# Patient Record
Sex: FEMALE | Race: OTHER | NOT HISPANIC OR LATINO
[De-identification: names, ages, dates, MRNs, and addresses within clinical notes are randomized per-mention and may not be internally consistent; named-entity substitution may affect disease eponyms.]

---

## 2017-10-17 ENCOUNTER — TRANSCRIPTION ENCOUNTER (OUTPATIENT)
Age: 72
End: 2017-10-17

## 2019-01-08 ENCOUNTER — TRANSCRIPTION ENCOUNTER (OUTPATIENT)
Age: 74
End: 2019-01-08

## 2022-11-25 ENCOUNTER — APPOINTMENT (OUTPATIENT)
Dept: INTERNAL MEDICINE | Facility: CLINIC | Age: 77
End: 2022-11-25

## 2022-12-26 ENCOUNTER — NON-APPOINTMENT (OUTPATIENT)
Age: 77
End: 2022-12-26

## 2022-12-27 ENCOUNTER — APPOINTMENT (OUTPATIENT)
Dept: INTERNAL MEDICINE | Facility: CLINIC | Age: 77
End: 2022-12-27

## 2023-04-07 ENCOUNTER — APPOINTMENT (OUTPATIENT)
Dept: INTERNAL MEDICINE | Facility: CLINIC | Age: 78
End: 2023-04-07
Payer: MEDICARE

## 2023-04-07 VITALS
WEIGHT: 113 LBS | HEIGHT: 60 IN | BODY MASS INDEX: 22.19 KG/M2 | SYSTOLIC BLOOD PRESSURE: 138 MMHG | OXYGEN SATURATION: 98 % | HEART RATE: 119 BPM | DIASTOLIC BLOOD PRESSURE: 84 MMHG

## 2023-04-07 DIAGNOSIS — Z82.49 FAMILY HISTORY OF ISCHEMIC HEART DISEASE AND OTHER DISEASES OF THE CIRCULATORY SYSTEM: ICD-10-CM

## 2023-04-07 DIAGNOSIS — M10.9 GOUT, UNSPECIFIED: ICD-10-CM

## 2023-04-07 DIAGNOSIS — Z00.00 ENCOUNTER FOR GENERAL ADULT MEDICAL EXAMINATION W/OUT ABNORMAL FINDINGS: ICD-10-CM

## 2023-04-07 PROCEDURE — 93000 ELECTROCARDIOGRAM COMPLETE: CPT | Mod: 59

## 2023-04-07 PROCEDURE — 90677 PCV20 VACCINE IM: CPT

## 2023-04-07 PROCEDURE — G0439: CPT

## 2023-04-07 PROCEDURE — G0444 DEPRESSION SCREEN ANNUAL: CPT | Mod: 59

## 2023-04-07 PROCEDURE — G0009: CPT

## 2023-04-07 PROCEDURE — 36415 COLL VENOUS BLD VENIPUNCTURE: CPT

## 2023-04-07 PROCEDURE — 99204 OFFICE O/P NEW MOD 45 MIN: CPT | Mod: 25

## 2023-04-07 NOTE — PLAN
[FreeTextEntry1] : HCM\par \par *Immunizations \par COVID -19 UTD\par Influenza 2022 \par Pneumococcal - patient requests prevnar - reports no history of receiving \par TDAP - due \par Shingrix - Patient is interested in the Shingrix vaccination. A prescription for Shingrix was given to the patient to be administered by the pharmacist.\par Patient will call us and inform us of date of vaccination.\par \par Preventive medicine discussed - including importance of lifestyle modification - with incorporation of healthy diet + regular exercise\par \par \par #HTN\par Patient with history of hypertension. \par BP today acceptable\par Patient to continue current regimen as prescribed. Patient instructed to continue monitoring blood pressure at home and to keep a journal. Will continue to monitor patient's bp and adjust his hypertensive regimen as needed.\par \par #Gout\par c/w allopurinol as prescribed \par \par FBW drawn

## 2023-04-07 NOTE — HISTORY OF PRESENT ILLNESS
[FreeTextEntry1] : Establish care  [de-identified] : Establish care \par Previous PCP moved to [facility] - has been lost to follow-up Dr. Nel Singer\par \par \par Patient has been in good overall health this past year and has no active complaints.\par All data, labs, consult notes and studies reviewed.

## 2023-04-07 NOTE — HEALTH RISK ASSESSMENT
[Very Good] : ~his/her~  mood as very good [No] : No [No falls in past year] : Patient reported no falls in the past year [0] : 2) Feeling down, depressed, or hopeless: Not at all (0) [PHQ-2 Negative - No further assessment needed] : PHQ-2 Negative - No further assessment needed [With Family] : lives with family [Retired] : retired [] :  [# Of Children ___] : has [unfilled] children [Feels Safe at Home] : Feels safe at home [Fully functional (bathing, dressing, toileting, transferring, walking, feeding)] : Fully functional (bathing, dressing, toileting, transferring, walking, feeding) [Fully functional (using the telephone, shopping, preparing meals, housekeeping, doing laundry, using] : Fully functional and needs no help or supervision to perform IADLs (using the telephone, shopping, preparing meals, housekeeping, doing laundry, using transportation, managing medications and managing finances) [Reports normal functional visual acuity (ie: able to read med bottle)] : Reports normal functional visual acuity [Never] : Never [de-identified] : active [de-identified] : balanced varied diet without restrictions  [[id number]] : 0 [Language] : denies difficulty with language [Handling Complex Tasks] : denies difficulty handling complex tasks [Reports changes in hearing] : Reports no changes in hearing [Reports changes in vision] : Reports no changes in vision [Reports changes in dental health] : Reports no changes in dental health [Smoke Detector] : no smoke detector [Safety elements used in home] : no safety elements used in home [MammogramComments] : denies  [PapSmearComments] : denies  [BoneDensityComments] : referral  [ColonoscopyComments] : denies

## 2023-04-10 ENCOUNTER — NON-APPOINTMENT (OUTPATIENT)
Age: 78
End: 2023-04-10

## 2023-04-10 LAB
25(OH)D3 SERPL-MCNC: 84.9 NG/ML
ALBUMIN SERPL ELPH-MCNC: 4.5 G/DL
ALP BLD-CCNC: 92 U/L
ALT SERPL-CCNC: 29 U/L
ANION GAP SERPL CALC-SCNC: 17 MMOL/L
AST SERPL-CCNC: 30 U/L
BASOPHILS # BLD AUTO: 0.07 K/UL
BASOPHILS NFR BLD AUTO: 0.8 %
BILIRUB SERPL-MCNC: 0.4 MG/DL
BUN SERPL-MCNC: 23 MG/DL
CALCIUM SERPL-MCNC: 10.9 MG/DL
CHLORIDE SERPL-SCNC: 103 MMOL/L
CHOLEST SERPL-MCNC: 200 MG/DL
CO2 SERPL-SCNC: 20 MMOL/L
CREAT SERPL-MCNC: 0.83 MG/DL
EGFR: 72 ML/MIN/1.73M2
EOSINOPHIL # BLD AUTO: 0.12 K/UL
EOSINOPHIL NFR BLD AUTO: 1.4 %
ESTIMATED AVERAGE GLUCOSE: 131 MG/DL
FERRITIN SERPL-MCNC: 610 NG/ML
FOLATE SERPL-MCNC: 14.1 NG/ML
GLUCOSE SERPL-MCNC: 132 MG/DL
HBA1C MFR BLD HPLC: 6.2 %
HCT VFR BLD CALC: 40.9 %
HDLC SERPL-MCNC: 69 MG/DL
HGB BLD-MCNC: 13.4 G/DL
IMM GRANULOCYTES NFR BLD AUTO: 0.8 %
IRON SATN MFR SERPL: 28 %
IRON SERPL-MCNC: 80 UG/DL
LDLC SERPL CALC-MCNC: 116 MG/DL
LYMPHOCYTES # BLD AUTO: 1.58 K/UL
LYMPHOCYTES NFR BLD AUTO: 17.9 %
MAN DIFF?: NORMAL
MCHC RBC-ENTMCNC: 30.5 PG
MCHC RBC-ENTMCNC: 32.8 GM/DL
MCV RBC AUTO: 93.2 FL
MONOCYTES # BLD AUTO: 0.45 K/UL
MONOCYTES NFR BLD AUTO: 5.1 %
NEUTROPHILS # BLD AUTO: 6.52 K/UL
NEUTROPHILS NFR BLD AUTO: 74 %
NONHDLC SERPL-MCNC: 132 MG/DL
PLATELET # BLD AUTO: 299 K/UL
POTASSIUM SERPL-SCNC: 3.4 MMOL/L
PROT SERPL-MCNC: 8.2 G/DL
RBC # BLD: 4.39 M/UL
RBC # FLD: 13.3 %
SODIUM SERPL-SCNC: 141 MMOL/L
TIBC SERPL-MCNC: 286 UG/DL
TRIGL SERPL-MCNC: 80 MG/DL
TSH SERPL-ACNC: 1.35 UIU/ML
UIBC SERPL-MCNC: 205 UG/DL
URATE SERPL-MCNC: 7.4 MG/DL
VIT B12 SERPL-MCNC: 991 PG/ML
WBC # FLD AUTO: 8.81 K/UL

## 2023-05-04 ENCOUNTER — NON-APPOINTMENT (OUTPATIENT)
Age: 78
End: 2023-05-04

## 2023-05-05 LAB — NONINV COLON CA DNA+OCC BLD SCRN STL QL: NEGATIVE

## 2023-08-02 ENCOUNTER — APPOINTMENT (OUTPATIENT)
Dept: INTERNAL MEDICINE | Facility: CLINIC | Age: 78
End: 2023-08-02
Payer: MEDICARE

## 2023-08-02 VITALS
SYSTOLIC BLOOD PRESSURE: 110 MMHG | HEART RATE: 96 BPM | BODY MASS INDEX: 23.75 KG/M2 | TEMPERATURE: 97 F | DIASTOLIC BLOOD PRESSURE: 70 MMHG | HEIGHT: 60 IN | OXYGEN SATURATION: 97 % | WEIGHT: 121 LBS

## 2023-08-02 DIAGNOSIS — R73.03 PREDIABETES.: ICD-10-CM

## 2023-08-02 DIAGNOSIS — I10 ESSENTIAL (PRIMARY) HYPERTENSION: ICD-10-CM

## 2023-08-02 DIAGNOSIS — T45.2X1A POISONING BY VITAMINS, ACCIDENTAL (UNINTENTIONAL), INITIAL ENCOUNTER: ICD-10-CM

## 2023-08-02 PROCEDURE — 99214 OFFICE O/P EST MOD 30 MIN: CPT

## 2023-08-02 NOTE — HISTORY OF PRESENT ILLNESS
[FreeTextEntry1] : Patient presents today for follow-up of chronic medical conditions.  [de-identified] : Patient has been in good overall health this past year and has no active complaints. All data, labs, consult notes and studies reviewed.   Has stopped taking all vitamins and supplements since last visit given lab results

## 2023-08-02 NOTE — HEALTH RISK ASSESSMENT
[Very Good] : ~his/her~  mood as very good [No] : No [No falls in past year] : Patient reported no falls in the past year [0] : 2) Feeling down, depressed, or hopeless: Not at all (0) [PHQ-2 Negative - No further assessment needed] : PHQ-2 Negative - No further assessment needed [de-identified] : active [de-identified] : balanced varied diet without restrictions  [[id number]] : 0 [Never] : Never [Language] : denies difficulty with language [Handling Complex Tasks] : denies difficulty handling complex tasks [With Family] : lives with family [Retired] : retired [] :  [# Of Children ___] : has [unfilled] children [Feels Safe at Home] : Feels safe at home [Fully functional (bathing, dressing, toileting, transferring, walking, feeding)] : Fully functional (bathing, dressing, toileting, transferring, walking, feeding) [Fully functional (using the telephone, shopping, preparing meals, housekeeping, doing laundry, using] : Fully functional and needs no help or supervision to perform IADLs (using the telephone, shopping, preparing meals, housekeeping, doing laundry, using transportation, managing medications and managing finances) [Reports changes in hearing] : Reports no changes in hearing [Reports changes in vision] : Reports no changes in vision [Reports normal functional visual acuity (ie: able to read med bottle)] : Reports normal functional visual acuity [Reports changes in dental health] : Reports no changes in dental health [Smoke Detector] : no smoke detector [Safety elements used in home] : no safety elements used in home [MammogramComments] : denies  [PapSmearComments] : denies  [BoneDensityComments] : referral  [ColonoscopyComments] : denies

## 2023-08-02 NOTE — PLAN
[FreeTextEntry1] : HCM  *Immunizations  COVID -19 UTD Influenza 2022  Pneumococcal - patient requests prevnar - reports no history of receiving  TDAP - due  Shingrix - Patient is interested in the Shingrix vaccination. A prescription for Shingrix was given to the patient to be administered by the pharmacist. Patient will call us and inform us of date of vaccination.  Preventive medicine discussed - including importance of lifestyle modification - with incorporation of healthy diet + regular exercise   #HTN Patient with history of hypertension.  BP today acceptable Patient to continue current regimen as prescribed. Patient instructed to continue monitoring blood pressure at home and to keep a journal. Will continue to monitor patient's bp and adjust his hypertensive regimen as needed.  #Gout c/w allopurinol as prescribed   FBW drawn - will confirm all blood work normalized after discontinuation of supplements/vitamins

## 2023-08-02 NOTE — REVIEW OF SYSTEMS
[Patient Intake Form Reviewed] : Patient intake form was reviewed [FreeTextEntry1] : Negative except per HPI/Intake Form\par

## 2023-08-09 LAB
25(OH)D3 SERPL-MCNC: 45.3 NG/ML
ALBUMIN SERPL ELPH-MCNC: 4.6 G/DL
ALP BLD-CCNC: 73 U/L
ALT SERPL-CCNC: 25 U/L
ANION GAP SERPL CALC-SCNC: 14 MMOL/L
AST SERPL-CCNC: 26 U/L
BILIRUB SERPL-MCNC: 0.6 MG/DL
BUN SERPL-MCNC: 24 MG/DL
CALCIUM SERPL-MCNC: 10.2 MG/DL
CHLORIDE SERPL-SCNC: 104 MMOL/L
CO2 SERPL-SCNC: 22 MMOL/L
CREAT SERPL-MCNC: 0.72 MG/DL
EGFR: 85 ML/MIN/1.73M2
ESTIMATED AVERAGE GLUCOSE: 134 MG/DL
FERRITIN SERPL-MCNC: 508 NG/ML
GLUCOSE SERPL-MCNC: 136 MG/DL
HBA1C MFR BLD HPLC: 6.3 %
POTASSIUM SERPL-SCNC: 3.8 MMOL/L
PROT SERPL-MCNC: 8.3 G/DL
SODIUM SERPL-SCNC: 139 MMOL/L

## 2023-08-26 ENCOUNTER — NON-APPOINTMENT (OUTPATIENT)
Age: 78
End: 2023-08-26

## 2023-09-12 ENCOUNTER — APPOINTMENT (OUTPATIENT)
Dept: RADIOLOGY | Facility: CLINIC | Age: 78
End: 2023-09-12
Payer: MEDICARE

## 2023-09-12 PROCEDURE — 77085 DXA BONE DENSITY AXL VRT FX: CPT

## 2023-10-03 ENCOUNTER — NON-APPOINTMENT (OUTPATIENT)
Age: 78
End: 2023-10-03

## 2023-10-03 ENCOUNTER — APPOINTMENT (OUTPATIENT)
Dept: OPHTHALMOLOGY | Facility: CLINIC | Age: 78
End: 2023-10-03
Payer: MEDICARE

## 2023-10-03 PROCEDURE — 92004 COMPRE OPH EXAM NEW PT 1/>: CPT

## 2023-12-27 ENCOUNTER — RX RENEWAL (OUTPATIENT)
Age: 78
End: 2023-12-27

## 2023-12-27 RX ORDER — LOSARTAN POTASSIUM 50 MG/1
50 TABLET, FILM COATED ORAL DAILY
Qty: 90 | Refills: 2 | Status: ACTIVE | COMMUNITY
Start: 2023-04-07 | End: 1900-01-01

## 2023-12-27 RX ORDER — ALLOPURINOL 100 MG/1
100 TABLET ORAL
Qty: 90 | Refills: 2 | Status: ACTIVE | COMMUNITY
Start: 2023-04-07 | End: 1900-01-01

## 2024-02-05 ENCOUNTER — RX RENEWAL (OUTPATIENT)
Age: 79
End: 2024-02-05

## 2024-02-05 RX ORDER — HYDROCHLOROTHIAZIDE 25 MG/1
25 TABLET ORAL
Qty: 30 | Refills: 3 | Status: ACTIVE | COMMUNITY
Start: 2023-04-07 | End: 1900-01-01

## 2024-11-25 ENCOUNTER — APPOINTMENT (OUTPATIENT)
Dept: INTERNAL MEDICINE | Facility: CLINIC | Age: 79
End: 2024-11-25
Payer: MEDICARE

## 2024-11-25 PROCEDURE — 36415 COLL VENOUS BLD VENIPUNCTURE: CPT

## 2024-12-05 ENCOUNTER — NON-APPOINTMENT (OUTPATIENT)
Age: 79
End: 2024-12-05

## 2024-12-05 ENCOUNTER — LABORATORY RESULT (OUTPATIENT)
Age: 79
End: 2024-12-05

## 2024-12-05 ENCOUNTER — APPOINTMENT (OUTPATIENT)
Dept: INTERNAL MEDICINE | Facility: CLINIC | Age: 79
End: 2024-12-05
Payer: MEDICARE

## 2024-12-05 VITALS
OXYGEN SATURATION: 95 % | WEIGHT: 118 LBS | HEART RATE: 103 BPM | DIASTOLIC BLOOD PRESSURE: 82 MMHG | BODY MASS INDEX: 23.16 KG/M2 | HEIGHT: 60 IN | SYSTOLIC BLOOD PRESSURE: 137 MMHG

## 2024-12-05 DIAGNOSIS — T45.2X1A POISONING BY VITAMINS, ACCIDENTAL (UNINTENTIONAL), INITIAL ENCOUNTER: ICD-10-CM

## 2024-12-05 DIAGNOSIS — Z00.00 ENCOUNTER FOR GENERAL ADULT MEDICAL EXAMINATION W/OUT ABNORMAL FINDINGS: ICD-10-CM

## 2024-12-05 DIAGNOSIS — I10 ESSENTIAL (PRIMARY) HYPERTENSION: ICD-10-CM

## 2024-12-05 DIAGNOSIS — R73.03 PREDIABETES.: ICD-10-CM

## 2024-12-05 DIAGNOSIS — M10.9 GOUT, UNSPECIFIED: ICD-10-CM

## 2024-12-05 PROCEDURE — 99215 OFFICE O/P EST HI 40 MIN: CPT | Mod: 25

## 2024-12-05 PROCEDURE — 90662 IIV NO PRSV INCREASED AG IM: CPT

## 2024-12-05 PROCEDURE — 93000 ELECTROCARDIOGRAM COMPLETE: CPT | Mod: 59

## 2024-12-05 PROCEDURE — G0444 DEPRESSION SCREEN ANNUAL: CPT | Mod: 59

## 2024-12-05 PROCEDURE — G0008: CPT

## 2024-12-05 PROCEDURE — G0439: CPT

## 2024-12-05 PROCEDURE — 36415 COLL VENOUS BLD VENIPUNCTURE: CPT

## 2024-12-05 RX ORDER — METOPROLOL SUCCINATE 25 MG/1
25 TABLET, EXTENDED RELEASE ORAL
Qty: 30 | Refills: 2 | Status: ACTIVE | COMMUNITY
Start: 2024-12-05 | End: 1900-01-01

## 2024-12-08 LAB
24R-OH-CALCIDIOL SERPL-MCNC: 60.1 PG/ML
25(OH)D3 SERPL-MCNC: 80.2 NG/ML
ALBUMIN MFR SERPL ELPH: 57 %
ALBUMIN SERPL ELPH-MCNC: 4.8 G/DL
ALBUMIN SERPL-MCNC: 4.8 G/DL
ALBUMIN/GLOB SERPL: 1.3 RATIO
ALBUPE: 42.2 %
ALP BLD-CCNC: 82 U/L
ALPHA1 GLOB MFR SERPL ELPH: 4 %
ALPHA1 GLOB SERPL ELPH-MCNC: 0.3 G/DL
ALPHA1UPE: 21.6 %
ALPHA2 GLOB MFR SERPL ELPH: 9.7 %
ALPHA2 GLOB SERPL ELPH-MCNC: 0.8 G/DL
ALPHA2UPE: 13.1 %
ALT SERPL-CCNC: 23 U/L
ANION GAP SERPL CALC-SCNC: 13 MMOL/L
AST SERPL-CCNC: 30 U/L
B-GLOBULIN MFR SERPL ELPH: 12.3 %
B-GLOBULIN SERPL ELPH-MCNC: 1 G/DL
BASOPHILS # BLD AUTO: 0.06 K/UL
BASOPHILS NFR BLD AUTO: 0.8 %
BETAUPE: 8.5 %
BILIRUB SERPL-MCNC: 0.4 MG/DL
BUN SERPL-MCNC: 29 MG/DL
CA-I SERPL-SCNC: 5.4 MG/DL
CALCIUM SERPL-MCNC: 10.5 MG/DL
CALCIUM SERPL-MCNC: 10.6 MG/DL
CHLORIDE SERPL-SCNC: 101 MMOL/L
CO2 SERPL-SCNC: 24 MMOL/L
CREAT SERPL-MCNC: 0.97 MG/DL
CREAT SPEC-SCNC: 237 MG/DL
CREAT/PROT UR: 0.3 RATIO
DEPRECATED KAPPA LC FREE/LAMBDA SER: 1.22 RATIO
EGFR: 59 ML/MIN/1.73M2
EOSINOPHIL # BLD AUTO: 0.15 K/UL
EOSINOPHIL NFR BLD AUTO: 1.9 %
GAMMA GLOB FLD ELPH-MCNC: 1.4 G/DL
GAMMA GLOB MFR SERPL ELPH: 17 %
GAMMAUPE: 14.6 %
GLUCOSE SERPL-MCNC: 129 MG/DL
HAPTOGLOB SERPL-MCNC: 160 MG/DL
HBV SURFACE AB SER QL: REACTIVE
HBV SURFACE AG SER QL: NONREACTIVE
HCT VFR BLD CALC: 43.8 %
HCV AB SER QL: NONREACTIVE
HCV S/CO RATIO: 0.14 S/CO
HGB BLD-MCNC: 13.9 G/DL
IGA 24H UR QL IFE: NORMAL
IGA SER QL IEP: 296 MG/DL
IGG SER QL IEP: 1394 MG/DL
IGM SER QL IEP: 119 MG/DL
IMM GRANULOCYTES NFR BLD AUTO: 0.9 %
INTERPRETATION SERPL IEP-IMP: NORMAL
KAPPA LC 24H UR QL: NORMAL
KAPPA LC CSF-MCNC: 2.8 MG/DL
KAPPA LC SERPL-MCNC: 3.42 MG/DL
LDH SERPL-CCNC: 242 U/L
LYMPHOCYTES # BLD AUTO: 1.42 K/UL
LYMPHOCYTES NFR BLD AUTO: 17.8 %
MAGNESIUM SERPL-MCNC: 2.2 MG/DL
MAN DIFF?: NORMAL
MCHC RBC-ENTMCNC: 30 PG
MCHC RBC-ENTMCNC: 31.7 G/DL
MCV RBC AUTO: 94.4 FL
MONOCYTES # BLD AUTO: 0.42 K/UL
MONOCYTES NFR BLD AUTO: 5.3 %
NEUTROPHILS # BLD AUTO: 5.86 K/UL
NEUTROPHILS NFR BLD AUTO: 73.3 %
PARATHYROID HORMONE INTACT: 31 PG/ML
PHOSPHATE SERPL-MCNC: 3.1 MG/DL
PLATELET # BLD AUTO: 347 K/UL
POTASSIUM SERPL-SCNC: 3.9 MMOL/L
PROT PATTERN 24H UR ELPH-IMP: NORMAL
PROT SERPL-MCNC: 8.4 G/DL
PROT UR-MCNC: 58 MG/DL
PROT UR-MCNC: 61 MG/DL
PROT UR-MCNC: 61 MG/DL
RBC # BLD: 4.64 M/UL
RBC # FLD: 13.4 %
SODIUM SERPL-SCNC: 139 MMOL/L
URATE SERPL-MCNC: 6.5 MG/DL
WBC # FLD AUTO: 7.98 K/UL

## 2024-12-09 LAB
COPPER SERPL-MCNC: 119 UG/DL
TM INTERPRETATION: NORMAL

## 2024-12-14 LAB — PTH RELATED PROT SERPL-MCNC: <2 PMOL/L

## 2025-01-20 ENCOUNTER — INPATIENT (INPATIENT)
Facility: HOSPITAL | Age: 80
LOS: 0 days | Discharge: HOME | DRG: 379 | End: 2025-01-21
Attending: STUDENT IN AN ORGANIZED HEALTH CARE EDUCATION/TRAINING PROGRAM | Admitting: STUDENT IN AN ORGANIZED HEALTH CARE EDUCATION/TRAINING PROGRAM
Payer: MEDICARE

## 2025-01-20 VITALS
RESPIRATION RATE: 19 BRPM | WEIGHT: 119.05 LBS | HEART RATE: 115 BPM | TEMPERATURE: 98 F | OXYGEN SATURATION: 99 % | SYSTOLIC BLOOD PRESSURE: 157 MMHG | DIASTOLIC BLOOD PRESSURE: 94 MMHG

## 2025-01-20 DIAGNOSIS — I10 ESSENTIAL (PRIMARY) HYPERTENSION: ICD-10-CM

## 2025-01-20 DIAGNOSIS — K92.1 MELENA: ICD-10-CM

## 2025-01-20 DIAGNOSIS — K92.2 GASTROINTESTINAL HEMORRHAGE, UNSPECIFIED: ICD-10-CM

## 2025-01-20 DIAGNOSIS — Z29.9 ENCOUNTER FOR PROPHYLACTIC MEASURES, UNSPECIFIED: ICD-10-CM

## 2025-01-20 DIAGNOSIS — Z90.49 ACQUIRED ABSENCE OF OTHER SPECIFIED PARTS OF DIGESTIVE TRACT: Chronic | ICD-10-CM

## 2025-01-20 LAB
ALBUMIN SERPL ELPH-MCNC: 4.1 G/DL — SIGNIFICANT CHANGE UP (ref 3.3–5)
ALBUMIN SERPL ELPH-MCNC: 4.1 G/DL — SIGNIFICANT CHANGE UP (ref 3.3–5)
ALP SERPL-CCNC: 76 U/L — SIGNIFICANT CHANGE UP (ref 40–120)
ALP SERPL-CCNC: 89 U/L — SIGNIFICANT CHANGE UP (ref 40–120)
ALT FLD-CCNC: 30 U/L — SIGNIFICANT CHANGE UP (ref 10–45)
ALT FLD-CCNC: 33 U/L — SIGNIFICANT CHANGE UP (ref 10–45)
ANION GAP SERPL CALC-SCNC: 14 MMOL/L — SIGNIFICANT CHANGE UP (ref 5–17)
ANION GAP SERPL CALC-SCNC: 17 MMOL/L — SIGNIFICANT CHANGE UP (ref 5–17)
APTT BLD: 33.1 SEC — SIGNIFICANT CHANGE UP (ref 24.5–35.6)
AST SERPL-CCNC: 25 U/L — SIGNIFICANT CHANGE UP (ref 10–40)
AST SERPL-CCNC: 85 U/L — HIGH (ref 10–40)
BASOPHILS # BLD AUTO: 0.08 K/UL — SIGNIFICANT CHANGE UP (ref 0–0.2)
BASOPHILS NFR BLD AUTO: 0.8 % — SIGNIFICANT CHANGE UP (ref 0–2)
BILIRUB SERPL-MCNC: 0.3 MG/DL — SIGNIFICANT CHANGE UP (ref 0.2–1.2)
BILIRUB SERPL-MCNC: 0.5 MG/DL — SIGNIFICANT CHANGE UP (ref 0.2–1.2)
BLD GP AB SCN SERPL QL: NEGATIVE — SIGNIFICANT CHANGE UP
BUN SERPL-MCNC: 26 MG/DL — HIGH (ref 7–23)
BUN SERPL-MCNC: 29 MG/DL — HIGH (ref 7–23)
CALCIUM SERPL-MCNC: 10.2 MG/DL — SIGNIFICANT CHANGE UP (ref 8.4–10.5)
CALCIUM SERPL-MCNC: 9.7 MG/DL — SIGNIFICANT CHANGE UP (ref 8.4–10.5)
CHLORIDE SERPL-SCNC: 102 MMOL/L — SIGNIFICANT CHANGE UP (ref 96–108)
CHLORIDE SERPL-SCNC: 103 MMOL/L — SIGNIFICANT CHANGE UP (ref 96–108)
CO2 SERPL-SCNC: 15 MMOL/L — LOW (ref 22–31)
CO2 SERPL-SCNC: 21 MMOL/L — LOW (ref 22–31)
CREAT SERPL-MCNC: 0.78 MG/DL — SIGNIFICANT CHANGE UP (ref 0.5–1.3)
CREAT SERPL-MCNC: 0.79 MG/DL — SIGNIFICANT CHANGE UP (ref 0.5–1.3)
EGFR: 76 ML/MIN/1.73M2 — SIGNIFICANT CHANGE UP
EGFR: 77 ML/MIN/1.73M2 — SIGNIFICANT CHANGE UP
EOSINOPHIL # BLD AUTO: 0.07 K/UL — SIGNIFICANT CHANGE UP (ref 0–0.5)
EOSINOPHIL NFR BLD AUTO: 0.7 % — SIGNIFICANT CHANGE UP (ref 0–6)
GAS PNL BLDV: SIGNIFICANT CHANGE UP
GLUCOSE SERPL-MCNC: 179 MG/DL — HIGH (ref 70–99)
GLUCOSE SERPL-MCNC: 200 MG/DL — HIGH (ref 70–99)
HCT VFR BLD CALC: 37.5 % — SIGNIFICANT CHANGE UP (ref 34.5–45)
HCT VFR BLD CALC: 40.9 % — SIGNIFICANT CHANGE UP (ref 34.5–45)
HGB BLD-MCNC: 12 G/DL — SIGNIFICANT CHANGE UP (ref 11.5–15.5)
HGB BLD-MCNC: 13 G/DL — SIGNIFICANT CHANGE UP (ref 11.5–15.5)
IMM GRANULOCYTES NFR BLD AUTO: 1.3 % — HIGH (ref 0–0.9)
INR BLD: 0.91 RATIO — SIGNIFICANT CHANGE UP (ref 0.85–1.16)
LYMPHOCYTES # BLD AUTO: 1.24 K/UL — SIGNIFICANT CHANGE UP (ref 1–3.3)
LYMPHOCYTES # BLD AUTO: 12 % — LOW (ref 13–44)
MCHC RBC-ENTMCNC: 29 PG — SIGNIFICANT CHANGE UP (ref 27–34)
MCHC RBC-ENTMCNC: 29.2 PG — SIGNIFICANT CHANGE UP (ref 27–34)
MCHC RBC-ENTMCNC: 31.8 G/DL — LOW (ref 32–36)
MCHC RBC-ENTMCNC: 32 G/DL — SIGNIFICANT CHANGE UP (ref 32–36)
MCV RBC AUTO: 91.1 FL — SIGNIFICANT CHANGE UP (ref 80–100)
MCV RBC AUTO: 91.2 FL — SIGNIFICANT CHANGE UP (ref 80–100)
MONOCYTES # BLD AUTO: 0.39 K/UL — SIGNIFICANT CHANGE UP (ref 0–0.9)
MONOCYTES NFR BLD AUTO: 3.8 % — SIGNIFICANT CHANGE UP (ref 2–14)
NEUTROPHILS # BLD AUTO: 8.43 K/UL — HIGH (ref 1.8–7.4)
NEUTROPHILS NFR BLD AUTO: 81.4 % — HIGH (ref 43–77)
NRBC # BLD: 0 /100 WBCS — SIGNIFICANT CHANGE UP (ref 0–0)
NRBC # BLD: 0 /100 WBCS — SIGNIFICANT CHANGE UP (ref 0–0)
NRBC BLD-RTO: 0 /100 WBCS — SIGNIFICANT CHANGE UP (ref 0–0)
NRBC BLD-RTO: 0 /100 WBCS — SIGNIFICANT CHANGE UP (ref 0–0)
PLATELET # BLD AUTO: 264 K/UL — SIGNIFICANT CHANGE UP (ref 150–400)
PLATELET # BLD AUTO: 324 K/UL — SIGNIFICANT CHANGE UP (ref 150–400)
POTASSIUM SERPL-MCNC: 4.2 MMOL/L — SIGNIFICANT CHANGE UP (ref 3.5–5.3)
POTASSIUM SERPL-MCNC: SIGNIFICANT CHANGE UP MMOL/L (ref 3.5–5.3)
POTASSIUM SERPL-SCNC: 4.2 MMOL/L — SIGNIFICANT CHANGE UP (ref 3.5–5.3)
POTASSIUM SERPL-SCNC: SIGNIFICANT CHANGE UP MMOL/L (ref 3.5–5.3)
PROT SERPL-MCNC: 7.5 G/DL — SIGNIFICANT CHANGE UP (ref 6–8.3)
PROT SERPL-MCNC: 8.5 G/DL — HIGH (ref 6–8.3)
PROTHROM AB SERPL-ACNC: 10.4 SEC — SIGNIFICANT CHANGE UP (ref 9.9–13.4)
RBC # BLD: 4.11 M/UL — SIGNIFICANT CHANGE UP (ref 3.8–5.2)
RBC # BLD: 4.49 M/UL — SIGNIFICANT CHANGE UP (ref 3.8–5.2)
RBC # FLD: 13.2 % — SIGNIFICANT CHANGE UP (ref 10.3–14.5)
RBC # FLD: 14 % — SIGNIFICANT CHANGE UP (ref 10.3–14.5)
RH IG SCN BLD-IMP: POSITIVE — SIGNIFICANT CHANGE UP
SODIUM SERPL-SCNC: 134 MMOL/L — LOW (ref 135–145)
SODIUM SERPL-SCNC: 138 MMOL/L — SIGNIFICANT CHANGE UP (ref 135–145)
WBC # BLD: 10.34 K/UL — SIGNIFICANT CHANGE UP (ref 3.8–10.5)
WBC # BLD: 8.33 K/UL — SIGNIFICANT CHANGE UP (ref 3.8–10.5)
WBC # FLD AUTO: 10.34 K/UL — SIGNIFICANT CHANGE UP (ref 3.8–10.5)
WBC # FLD AUTO: 8.33 K/UL — SIGNIFICANT CHANGE UP (ref 3.8–10.5)

## 2025-01-20 PROCEDURE — 99285 EMERGENCY DEPT VISIT HI MDM: CPT

## 2025-01-20 PROCEDURE — 99223 1ST HOSP IP/OBS HIGH 75: CPT | Mod: GC

## 2025-01-20 RX ORDER — LOSARTAN POTASSIUM 100 MG
50 TABLET ORAL DAILY
Refills: 0 | Status: DISCONTINUED | OUTPATIENT
Start: 2025-01-20 | End: 2025-01-21

## 2025-01-20 RX ORDER — ACETAMINOPHEN, DIPHENHYDRAMINE HCL, PHENYLEPHRINE HCL 325; 25; 5 MG/1; MG/1; MG/1
3 TABLET ORAL AT BEDTIME
Refills: 0 | Status: DISCONTINUED | OUTPATIENT
Start: 2025-01-20 | End: 2025-01-21

## 2025-01-20 RX ORDER — METOPROLOL SUCCINATE 25 MG
1 TABLET, EXTENDED RELEASE 24 HR ORAL
Refills: 0 | DISCHARGE

## 2025-01-20 RX ORDER — METOPROLOL SUCCINATE 25 MG
25 TABLET, EXTENDED RELEASE 24 HR ORAL DAILY
Refills: 0 | Status: DISCONTINUED | OUTPATIENT
Start: 2025-01-20 | End: 2025-01-21

## 2025-01-20 RX ORDER — LOSARTAN POTASSIUM 100 MG
50 TABLET ORAL DAILY
Refills: 0 | Status: DISCONTINUED | OUTPATIENT
Start: 2025-01-20 | End: 2025-01-20

## 2025-01-20 RX ORDER — ALLOPURINOL 300 MG
100 TABLET ORAL DAILY
Refills: 0 | Status: DISCONTINUED | OUTPATIENT
Start: 2025-01-20 | End: 2025-01-21

## 2025-01-20 RX ORDER — HYDROCHLOROTHIAZIDE 50 MG
1 TABLET ORAL
Refills: 0 | DISCHARGE

## 2025-01-20 RX ORDER — ALLOPURINOL 300 MG
1 TABLET ORAL
Refills: 0 | DISCHARGE

## 2025-01-20 RX ORDER — METOPROLOL SUCCINATE 25 MG
25 TABLET, EXTENDED RELEASE 24 HR ORAL DAILY
Refills: 0 | Status: DISCONTINUED | OUTPATIENT
Start: 2025-01-20 | End: 2025-01-20

## 2025-01-20 RX ORDER — LOSARTAN POTASSIUM 100 MG
1 TABLET ORAL
Refills: 0 | DISCHARGE

## 2025-01-20 NOTE — PATIENT PROFILE ADULT - FALL HARM RISK - RISK INTERVENTIONS

## 2025-01-20 NOTE — H&P ADULT - NSHPREVIEWOFSYSTEMS_GEN_ALL_CORE
REVIEW OF SYSTEMS:  CONSTITUTIONAL: No weakness, fevers or chills  EYES/ENT: No visual changes;   NECK: No pain or stiffness  RESPIRATORY: + cough, wheezing, hemoptysis; No shortness of breath  CARDIOVASCULAR: No chest pain or palpitations  GASTROINTESTINAL: No abdominal or epigastric pain. No nausea, vomiting, or hematemesis; No diarrhea or constipation. + hematochezia.  GENITOURINARY: No dysuria, frequency or hematuria  NEUROLOGICAL: No numbness or weakness  SKIN: No itching, rashes

## 2025-01-20 NOTE — ED PROVIDER NOTE - PHYSICAL EXAMINATION
Physical Exam:  General: NAD, Conversive  Eyes: EOMI, Conjunctiva and sclera clear  Neck: No JVD  Lungs: Clear to auscultation bilaterally, no wheeze, no rhonchi  Heart: Normal S1, S2, no murmurs  Abdomen: Soft, nontender, nondistended, no CVA tenderness  Extremities: 2+ peripheral pulses, no edema  : Rectal exam chaperoned by PA luisito marshall shows no external hemorrhoids. There is dark red blood in the rectum with no palpable internal hemorrhoids.   Psych: AAO X3  Neurologic: Non-focal

## 2025-01-20 NOTE — ED PROVIDER NOTE - PROGRESS NOTE DETAILS
Ree: Patient remains HDS. Labs initially hemolyzed chemistry, on repeat improved when not hemolyzed. CBC with hgb 13. Will admit to hospitalist and place GI consult.

## 2025-01-20 NOTE — H&P ADULT - NSHPPHYSICALEXAM_GEN_ALL_CORE
Vital Signs Last 24 Hrs  T(C): 36.8 (20 Jan 2025 10:19), Max: 36.8 (20 Jan 2025 10:19)  T(F): 98.2 (20 Jan 2025 10:19), Max: 98.2 (20 Jan 2025 10:19)  HR: 94 (20 Jan 2025 13:11) (94 - 115)  BP: 126/69 (20 Jan 2025 13:11) (126/69 - 161/91)  BP(mean): --  RR: 18 (20 Jan 2025 11:57) (18 - 19)  SpO2: 98% (20 Jan 2025 11:57) (98% - 99%)    Parameters below as of 20 Jan 2025 11:57  Patient On (Oxygen Delivery Method): room air        PHYSICAL EXAM:  GENERAL: NAD, lying in bed comfortably  HEAD:  Atraumatic, Normocephalic  EYES: EOMI, PERRLA, conjunctiva and sclera clear  ENT: Moist mucous membranes  NECK: Supple, No JVD  CHEST/LUNG: Clear to auscultation bilaterally; No rales, rhonchi, wheezing, or rubs. Unlabored respirations  HEART: Regular rate and rhythm; No murmurs, rubs, or gallops  ABDOMEN: Bowel sounds present; Soft, Nontender, Nondistended. No hepatomegally  EXTREMITIES:  2+ Peripheral Pulses, brisk capillary refill. No clubbing, cyanosis, or edema  NERVOUS SYSTEM:  Alert & Oriented X3, speech clear. No deficits   MSK: FROM all 4 extremities, full and equal strength  SKIN: No rashes or lesions Vital Signs Last 24 Hrs  T(C): 36.8 (20 Jan 2025 10:19), Max: 36.8 (20 Jan 2025 10:19)  T(F): 98.2 (20 Jan 2025 10:19), Max: 98.2 (20 Jan 2025 10:19)  HR: 94 (20 Jan 2025 13:11) (94 - 115)  BP: 126/69 (20 Jan 2025 13:11) (126/69 - 161/91)  BP(mean): --  RR: 18 (20 Jan 2025 11:57) (18 - 19)  SpO2: 98% (20 Jan 2025 11:57) (98% - 99%)    Parameters below as of 20 Jan 2025 11:57  Patient On (Oxygen Delivery Method): room air        PHYSICAL EXAM:  GENERAL: NAD, lying in bed comfortably  HEAD:  Atraumatic, Normocephalic  EYES: EOMI, PERRL, conjunctiva and sclera clear  ENT: Moist mucous membranes  NECK: Supple, No JVD  CHEST/LUNG: Clear to auscultation bilaterally; No rales, rhonchi, wheezing, or rubs. Unlabored respirations  HEART: Regular rate and rhythm; No murmurs, rubs, or gallops  ABDOMEN: Bowel sounds present; Soft, Nontender, Nondistended.   EXTREMITIES:  2+ Peripheral Pulses, brisk capillary refill. No clubbing, cyanosis, or edema  NERVOUS SYSTEM:  Alert & Oriented X3, speech clear. No deficits   MSK: FROM all 4 extremities, full and equal strength  SKIN: No rashes or lesions

## 2025-01-20 NOTE — H&P ADULT - PROBLEM SELECTOR PLAN 1
Painless Hematochezia. Dark red blood found in rectal vault on GI exam. Hgb 13 on admission with slight drop to 12. Hemodynamically stable.     Plan:   -GI following  -trend CBC, transfuse for Hgb <7  -f/u AM CBC, CMP, PT/INR  -maintain active T&S  -clear liquid diet Painless Hematochezia. Dark red blood found in rectal vault on GI exam. Hgb 13 on admission with slight drop to 12. Hemodynamically stable. Per GI, if patient with resolving hematochezia and with stable hemoglobin can likely defer to outpatient colonoscopy.  However, if patient with worsening hematochezia, with downtrending hemoglobin will consider for inpatient endoscopic evaluation. If patient w/ worsening hematochezia overnight w/ associated hemodynamic changes requiring pRBC transfusion, would obtain CTA for localization of bleeding source.     Plan:   -GI following  -trend CBC, transfuse for Hgb <7  -f/u AM CBC, CMP, PT/INR  -maintain active T&S  -clear liquid diet Painless Hematochezia. Dark red blood found in rectal vault on GI exam. Hgb 13 on admission with slight drop to 12. Hemodynamically stable. Per GI, if patient with resolving hematochezia and with stable hemoglobin can likely defer to outpatient colonoscopy.  However, if patient with worsening hematochezia, with downtrending hemoglobin will consider for inpatient endoscopic evaluation. If patient w/ worsening hematochezia overnight w/ associated hemodynamic changes requiring pRBC transfusion, would obtain CTA for localization of bleeding source.     Plan:   -GI following  -trend CBC, transfuse for Hgb <7  -f/u AM CBC, CMP, PT/INR  -maintain active T&S  -clear liquid diet  -Hold Aspirin

## 2025-01-20 NOTE — H&P ADULT - ATTENDING COMMENTS
Above note not yet complete.     Patient seen and examined. Plan as discussed w/ Dr. Simmons: monitor for further episodes of acute bleeding; H&H WNL and hemodynamically stable -- continue to monitor closely holding home antihypertensives for now (if BPs elevated, can resume w/ hold parameters and close monitoring); GI consulted by ED, appreciate their recommendations: "- Keep on clear liquid diet, - If patient with resolving hematochezia and with stable hemoglobin can likely defer to outpatient colonoscopy.  However, if patient with worsening hematochezia, with downtrending hemoglobin will consider for inpatient endoscopic evaluation, - Trend CBC, transfuse for Hgb < 7, - If patient w/ worsening hematochezia overnight w/ associated hemodynamic changes requiring pRBC transfusion, would obtain CTA for localization of bleeding source;" monitor stool count; maintain fall precautions. Above note not yet complete.     Patient seen and examined. Plan as discussed w/ Dr. Simmons: hold home ASA; monitor for further episodes of acute bleeding; H&H WNL and hemodynamically stable -- continue to monitor closely holding home antihypertensives for now (if BPs elevated, can resume w/ hold parameters and close monitoring); GI consulted by ED, appreciate their recommendations: "- Keep on clear liquid diet, - If patient with resolving hematochezia and with stable hemoglobin can likely defer to outpatient colonoscopy.  However, if patient with worsening hematochezia, with downtrending hemoglobin will consider for inpatient endoscopic evaluation, - Trend CBC, transfuse for Hgb < 7, - If patient w/ worsening hematochezia overnight w/ associated hemodynamic changes requiring pRBC transfusion, would obtain CTA for localization of bleeding source;" monitor stool count; maintain fall precautions.

## 2025-01-20 NOTE — ED PROVIDER NOTE - ATTENDING CONTRIBUTION TO CARE
Attending MD Sunshine:  I personally have seen and examined this patient. I have performed a substantive portion of the visit including all aspects of the medical decision making.  Fellow note reviewed and agree on plan of care and except where noted.      79-year-old woman with history of hypertension is presenting for evaluation of hematochezia.  She states she has had 3 episodes of hematochezia since this morning.  She had a recent episode here in the emergency department.  Denies any associate abdominal pain.  Has had a bit of a cough recently but that is improved recently.  Reports having a Cologuard screening 1 year ago that was reportedly normal.  Is on a baby aspirin daily no other anticoagulants.  Denies fevers chills lightheadedness dizziness.  No history of hematochezia in the past.    PMD Aisha Delcid      Patient's vital signs are notable for heart rate 115 blood pressure 157 systolic otherwise nonactionable.  The patient is sitting in the stretcher in no apparent distress.  She is breathing comfortably on room air.  Pulmonary examination with clear lungs posteriorly.  Heart sounds are slightly tachycardic but regular.  The abdomen is soft nondistended nontender.  Extremities warm and well-perfused.  Distal pulses full and equal bilateral upper and lower extremities.  Visualized grossly bloody bowel movement here in emergency department.      Patient's presenting for evaluation of hematochezia, concern for lower GI bleeding uncertain source at this time given age and antiplatelet use, patient warrants admission to hospital for serial H&H monitoring and GI consultation.  Plan for 2 large-bore peripheral IV access.  CBC coagulation profile.  At this juncture patient is normotensive low-grade tachycardia is noted but no indication for emergent transfusion yet at this juncture but will continue monitoring.              *The above represents an initial assessment/impression. Please refer to progress notes for potential changes in patient clinical course*

## 2025-01-20 NOTE — H&P ADULT - PROBLEM SELECTOR PLAN 2
c/w outpt medications Metroprolol 25mg QD, Losartan 50mg QD, Hydrochlorothiazide 25mg QD c/w outpt medications Metroprolol 25mg QD, Losartan 50mg QD  -Hold hydrochlorothiazide 25mg QD i/s/o GI bleed

## 2025-01-20 NOTE — CONSULT NOTE ADULT - SUBJECTIVE AND OBJECTIVE BOX
Chief Complaint:  Patient is a 79y old  Female who presents with a chief complaint of     HPI:  ERLINDA REYES is a 79year old Female with history of hypertension, hyperlipidemia presenting in the setting of hematochezia.     She reports that this morning she noticed multiple episodes of bright red blood mixed with stool.  Previously she had normal brown stools.  Patient denies any associate abdominal pain, nausea, vomiting, diarrhea.  She is never had any symptoms like this before.  Denies feeling lightheaded no chest pain or shortness of breath.  She is on baby aspirin, last dose yesterday.  Her last colonoscopy was greater than 10 years ago, reports that it was normal.  Patient reports that while the initial bowel movement earlier this morning was very bloody, she had noted that her bowel movements since improved and it appeared that the bleeding has been resolving.    In the ED the patient had otherwise able vital signs.  Hemoglobin was 13 initially, with mild decreased to 12.  Baseline is around 13.  Patient is admitted in the setting of hematochezia workup.    Otherwise, patient denies fevers, chills, weight loss, dysphagia, odynophagia, early satiety, poor oral intake, abdominal pain, nausea, vomiting, diarrhea, melena, hematemesis, hematochezia, change in stool caliber, or family history of GI-related cancers.    ROS:   General:  No fevers, chills, night sweats  Eyes:  Good vision, no reported pain  ENT:  No sore throat, pain, runny nose  CV:  No pain, palpitations  Pulm:  No dyspnea, cough  GI:  See HPI, otherwise negative  :  No incontinence, nocturia  Muscle:  No reported pain, weakness  Neuro:  No memory problems  Psych:  No insomnia, psychosis  Endocrine:  No polyuria, polydipsia  Heme:  No petechiae, ecchymosis, easy bruisability  Skin:  No reported rash    PMHX/PSHX:    Gout    Benign Essential Hypertension    Arthritis    History of cholecystectomy      Allergies:  No Known Allergies      Home Medications: reviewed  Hospital Medications:      Social History:   Tobacco: Denies  EtOH: Denies  Illicit Drugs: Denies    Family history:    No pertinent family history in first degree relatives      Denies family history of colon cancer/polyps, stomach cancer/polyps, pancreatic cancer/masses, liver cancer/disease, ovarian cancer and endometrial cancer.    PHYSICAL EXAM:   Vital Signs:  Vital Signs Last 24 Hrs  T(C): 36.8 (20 Jan 2025 15:27), Max: 36.8 (20 Jan 2025 10:19)  T(F): 98.2 (20 Jan 2025 15:27), Max: 98.2 (20 Jan 2025 10:19)  HR: 82 (20 Jan 2025 15:27) (82 - 115)  BP: 131/76 (20 Jan 2025 15:27) (126/69 - 161/91)  BP(mean): --  RR: 16 (20 Jan 2025 15:27) (16 - 19)  SpO2: 96% (20 Jan 2025 15:27) (96% - 99%)    Parameters below as of 20 Jan 2025 15:27  Patient On (Oxygen Delivery Method): room air      Daily     Daily     GENERAL: no acute distress  NEURO: alert  HEENT: anicteric sclera, no conjunctival pallor appreciated  CHEST: no respiratory distress, no accessory muscle use  CARDIAC: regular rate, +S1/S2  ABDOMEN: soft, nondistended, nontender, no rebound or guarding  RECTAL: chaperoned by ED RN, verbal consent obtained from patient. No internal hemorrhoids palpated, dark red blood in rectal vault  EXTREMITIES: warm, well perfused, no edema  SKIN: no lesions noted    LABS: reviewed                        12.0   8.33  )-----------( 264      ( 20 Jan 2025 14:26 )             37.5     01-20    138  |  103  |  26[H]  ----------------------------<  179[H]  4.2   |  21[L]  |  0.79    Ca    10.2      20 Jan 2025 13:12    TPro  7.5  /  Alb  4.1  /  TBili  0.3  /  DBili  x   /  AST  25  /  ALT  30  /  AlkPhos  89  01-20    LIVER FUNCTIONS - ( 20 Jan 2025 13:12 )  Alb: 4.1 g/dL / Pro: 7.5 g/dL / ALK PHOS: 89 U/L / ALT: 30 U/L / AST: 25 U/L / GGT: x               Diagnostic Studies: see sunrise for full report

## 2025-01-20 NOTE — H&P ADULT - ASSESSMENT
79-year-old female presenting for rectal bleeding.  Vitals initially with tachycardia, however on recheck in the ED-- all were within normal limits. Hemodynamically stable without signs of anemia-- Hgb 13 on admission with mild decrease to 12.  Per GI exam-- dark red blood in vault, no internal hemorrhoids palpated.  Differential includes hemorrhoids     High concern for lower GI bleed, most likely diverticular.  Low concern for brisk upper GI bleed as she is hemodynamically stable, not having any signs of anemia.  Will obtain labs to evaluate for degree of blood loss.  At this time do not feel she needs a CT angiogram as she is hemodynamically stable. 79-year-old female presenting for rectal bleeding.  Vitals initially with tachycardia, however on recheck in the ED-- all were within normal limits. Hemodynamically stable without signs of anemia-- Hgb 13 on admission with mild decrease to 12.  Per GI exam-- dark red blood in vault, no internal hemorrhoids palpated. Cologuard negative last yr, last colonoscopy more than 10yrs ago. Most likely lower GI Bleed--differential includes hemorrhoids vs diverticulosis.

## 2025-01-20 NOTE — CONSULT NOTE ADULT - ATTENDING COMMENTS
Agree with above. Repeat H/h is stable, patient has not had any additional hematochezia. Had BM earlier today that was brown. Given stable H/H and no further bleeding, can follow-up as outpatient for colonoscopy. Can call [phone] for appointment.

## 2025-01-20 NOTE — ED ADULT NURSE NOTE - OBJECTIVE STATEMENT
80 yo presents to the ED from home. A&OX4, ambulatory with daughter at bedside c/o rectal bleeding. Patient reports starting this morning she had multiple episodes of bright red blood per rectum. She states it is mixed with stool. She has no abdominal pain or rectal pain. She has never had symptoms like this before. She is not feeling lightheaded, chest pain, shortness of breath or any other symptoms. She takes baby aspirin but no other anticoagulant.  Last colonoscopy was over 10 years ago. 20G inserted R hand and 20G inserted L wrist. Patient undressed and placed into gown, call bell in hand and side rails up for safety. warm blanket provided, vital signs stable, pt in no acute distress.

## 2025-01-20 NOTE — H&P ADULT - NSHPLABSRESULTS_GEN_ALL_CORE
LABS:                          12.0   8.33  )-----------( 264      ( 20 Jan 2025 14:26 )             37.5     01-20    138  |  103  |  26[H]  ----------------------------<  179[H]  4.2   |  21[L]  |  0.79    Ca    10.2      20 Jan 2025 13:12    TPro  7.5  /  Alb  4.1  /  TBili  0.3  /  DBili  x   /  AST  25  /  ALT  30  /  AlkPhos  89  01-20    PT/INR - ( 20 Jan 2025 11:19 )   PT: 10.4 sec;   INR: 0.91 ratio         PTT - ( 20 Jan 2025 11:19 )  PTT:33.1 sec

## 2025-01-20 NOTE — H&P ADULT - HISTORY OF PRESENT ILLNESS
79 yr old woman with history of HTN, Rheumatoid Arthritis, cholecystectomy and hysterectomy presents for 1 day of hematochezia. The pt first noticed blood in her stool the morning of 1/20 which she had 3 episodes. She had one additional episode in the ED which she says was improved but blood was still present. States that the blood was mixed in with the stool and the color was bright red. Never had an episode like this before. Not associated with any abdominal pain , changes in diet, nausea, vomiting, fever or pain. Has a cough that she states has been resolving. Her normal bowel movement schedule is 1x daily. She takes baby aspirin at home but no other anticoagulants. Last colonoscopy was more than 10 years ago. No history of hemorrhoids.

## 2025-01-20 NOTE — CONSULT NOTE ADULT - ASSESSMENT
ERLINDA REYES is a 79year old Female with history of hypertension, hyperlipidemia presenting in the setting of 1 day hx of hematochezia.       #Painless Hematochezia   Patient with 1 day history of painless hematochezia, described as bright red blood mixed with stool with 3 episodes over the past day.  Hemoglobin on admission 13, which is patient's baseline.  Currently hemodynamically stable, reports last bowel movement with resolving hematochezia.  Rectal with dark red blood in vault, no internal hemorrhoids palpated.  Last colonoscopy greater than 10 years ago, reports to be normal.    Clinical presentation most consistent with lower GI bleeding, the differential diagnosis for which includes hemorroids vs diverticulosis, angioectasias, malignancy, colitis, or SCAD [segmental colitis associated with diverticulosis].    Recommendations:  - Keep on clear liquid diet  - If patient with resolving hematochezia and with stable hemoglobin can likely defer to outpatient colonoscopy.  However, if patient with worsening hematochezia, with downtrending hemoglobin will consider for inpatient endoscopic evaluation.  - Trend CBC, transfuse for Hgb < 7  - Please obtain CBC, CMP, PT/INR, active T&S  - If patient w/ worsening hematochezia overnight w/ associated hemodynamic changes requiring pRBC transfusion, would obtain CTA for localization of bleeding source.     Note incomplete until finalized by attending signature/attestation.    Davy Mcdonald  GI/Hepatology Fellow, PGY-4    MONDAY-FRIDAY 8AM-5PM:  Please message via [employer] or email [email or web address] (acquired by [employer] in 2014)[email or web address] OR [email or web address]     On Weekends/Holidays (All day) and Weekdays after 5 PM to 8 AM  For nonurgent consults please email:  Please email [email or web address] OR [email or web address]  For urgent consults:  Please contact on call GI team. See Amion schedule ([facility]), Intrinsic-ID paging system ([facility]), or call hospital  ([facility])

## 2025-01-20 NOTE — ED PROVIDER NOTE - CLINICAL SUMMARY MEDICAL DECISION MAKING FREE TEXT BOX
This is a 79-year-old female with history as above presenting for rectal bleeding.  Vitals initially with tachycardia, however on recheck it is normal.  Exam with red blood in the rectum.  High concern for lower GI bleed, most likely diverticular.  Low concern for brisk upper GI bleed as she is hemodynamically stable, not having any signs of anemia.  Will obtain labs to evaluate for degree of blood loss.  At this time do not feel she needs a CT angiogram as she is hemodynamically stable.

## 2025-01-20 NOTE — ED PROVIDER NOTE - OBJECTIVE STATEMENT
This is an 79-year-old female with history of hypertension presenting for rectal bleeding.  Patient reports starting this morning she had multiple episodes of bright red blood per rectum.  She states it is mixed with stool.  She has no abdominal pain or rectal pain.  She has never had symptoms like this before.  She is not feeling lightheaded, chest pain, shortness of breath or any other symptoms.  She takes baby aspirin but no other anticoagulant.  Last colonoscopy was over 10 years ago.

## 2025-01-21 ENCOUNTER — TRANSCRIPTION ENCOUNTER (OUTPATIENT)
Age: 80
End: 2025-01-21

## 2025-01-21 VITALS
SYSTOLIC BLOOD PRESSURE: 118 MMHG | DIASTOLIC BLOOD PRESSURE: 78 MMHG | OXYGEN SATURATION: 95 % | TEMPERATURE: 98 F | HEART RATE: 97 BPM | RESPIRATION RATE: 18 BRPM

## 2025-01-21 LAB
BASOPHILS # BLD AUTO: 0.05 K/UL — SIGNIFICANT CHANGE UP (ref 0–0.2)
BASOPHILS NFR BLD AUTO: 0.7 % — SIGNIFICANT CHANGE UP (ref 0–2)
EOSINOPHIL # BLD AUTO: 0.17 K/UL — SIGNIFICANT CHANGE UP (ref 0–0.5)
EOSINOPHIL NFR BLD AUTO: 2.3 % — SIGNIFICANT CHANGE UP (ref 0–6)
HCT VFR BLD CALC: 36.4 % — SIGNIFICANT CHANGE UP (ref 34.5–45)
HGB BLD-MCNC: 11.7 G/DL — SIGNIFICANT CHANGE UP (ref 11.5–15.5)
IMM GRANULOCYTES NFR BLD AUTO: 1.1 % — HIGH (ref 0–0.9)
LYMPHOCYTES # BLD AUTO: 1.26 K/UL — SIGNIFICANT CHANGE UP (ref 1–3.3)
LYMPHOCYTES # BLD AUTO: 17.3 % — SIGNIFICANT CHANGE UP (ref 13–44)
MAGNESIUM SERPL-MCNC: 2 MG/DL — SIGNIFICANT CHANGE UP (ref 1.6–2.6)
MCHC RBC-ENTMCNC: 30 PG — SIGNIFICANT CHANGE UP (ref 27–34)
MCHC RBC-ENTMCNC: 32.1 G/DL — SIGNIFICANT CHANGE UP (ref 32–36)
MCV RBC AUTO: 93.3 FL — SIGNIFICANT CHANGE UP (ref 80–100)
MONOCYTES # BLD AUTO: 0.45 K/UL — SIGNIFICANT CHANGE UP (ref 0–0.9)
MONOCYTES NFR BLD AUTO: 6.2 % — SIGNIFICANT CHANGE UP (ref 2–14)
NEUTROPHILS # BLD AUTO: 5.26 K/UL — SIGNIFICANT CHANGE UP (ref 1.8–7.4)
NEUTROPHILS NFR BLD AUTO: 72.4 % — SIGNIFICANT CHANGE UP (ref 43–77)
NRBC # BLD: 0 /100 WBCS — SIGNIFICANT CHANGE UP (ref 0–0)
NRBC BLD-RTO: 0 /100 WBCS — SIGNIFICANT CHANGE UP (ref 0–0)
PHOSPHATE SERPL-MCNC: 3.6 MG/DL — SIGNIFICANT CHANGE UP (ref 2.5–4.5)
PLATELET # BLD AUTO: 266 K/UL — SIGNIFICANT CHANGE UP (ref 150–400)
RBC # BLD: 3.9 M/UL — SIGNIFICANT CHANGE UP (ref 3.8–5.2)
RBC # FLD: 13.3 % — SIGNIFICANT CHANGE UP (ref 10.3–14.5)
WBC # BLD: 7.27 K/UL — SIGNIFICANT CHANGE UP (ref 3.8–10.5)
WBC # FLD AUTO: 7.27 K/UL — SIGNIFICANT CHANGE UP (ref 3.8–10.5)

## 2025-01-21 PROCEDURE — 86900 BLOOD TYPING SEROLOGIC ABO: CPT

## 2025-01-21 PROCEDURE — 82435 ASSAY OF BLOOD CHLORIDE: CPT

## 2025-01-21 PROCEDURE — 85025 COMPLETE CBC W/AUTO DIFF WBC: CPT

## 2025-01-21 PROCEDURE — 82330 ASSAY OF CALCIUM: CPT

## 2025-01-21 PROCEDURE — 86850 RBC ANTIBODY SCREEN: CPT

## 2025-01-21 PROCEDURE — 84100 ASSAY OF PHOSPHORUS: CPT

## 2025-01-21 PROCEDURE — 85014 HEMATOCRIT: CPT

## 2025-01-21 PROCEDURE — 84132 ASSAY OF SERUM POTASSIUM: CPT

## 2025-01-21 PROCEDURE — 99239 HOSP IP/OBS DSCHRG MGMT >30: CPT | Mod: GC

## 2025-01-21 PROCEDURE — 99222 1ST HOSP IP/OBS MODERATE 55: CPT | Mod: GC

## 2025-01-21 PROCEDURE — 85027 COMPLETE CBC AUTOMATED: CPT

## 2025-01-21 PROCEDURE — 83605 ASSAY OF LACTIC ACID: CPT

## 2025-01-21 PROCEDURE — 85730 THROMBOPLASTIN TIME PARTIAL: CPT

## 2025-01-21 PROCEDURE — 83735 ASSAY OF MAGNESIUM: CPT

## 2025-01-21 PROCEDURE — 85610 PROTHROMBIN TIME: CPT

## 2025-01-21 PROCEDURE — 86901 BLOOD TYPING SEROLOGIC RH(D): CPT

## 2025-01-21 PROCEDURE — 99285 EMERGENCY DEPT VISIT HI MDM: CPT

## 2025-01-21 PROCEDURE — 85018 HEMOGLOBIN: CPT

## 2025-01-21 PROCEDURE — 82947 ASSAY GLUCOSE BLOOD QUANT: CPT

## 2025-01-21 PROCEDURE — 82803 BLOOD GASES ANY COMBINATION: CPT

## 2025-01-21 PROCEDURE — 80053 COMPREHEN METABOLIC PANEL: CPT

## 2025-01-21 PROCEDURE — 84295 ASSAY OF SERUM SODIUM: CPT

## 2025-01-21 RX ORDER — ASPIRIN 81 MG/1
1 TABLET, COATED ORAL
Refills: 0 | DISCHARGE

## 2025-01-21 RX ADMIN — Medication 100 MILLIGRAM(S): at 13:10

## 2025-01-21 NOTE — DISCHARGE NOTE PROVIDER - NSDCCPCAREPLAN_GEN_ALL_CORE_FT
PRINCIPAL DISCHARGE DIAGNOSIS  Diagnosis: Hematochezia  Assessment and Plan of Treatment: Rectal bleeding in small amounts is common. You may see red spotting on toilet paper or drops of blood in the toilet. Rectal bleeding has many possible causes, from something as minor as hemorrhoids to something as serious as colon cancer. You may need more tests with GI to find the cause of your bleeding. It is important that you follow up with your Primary Care provider on when to resume Aspirin.   To Do:   >Avoid aspirin and other non-steroidal anti-inflammatory drugs (NSAIDs), such as ibuprofen (Advil, Motrin) and naproxen (Aleve). They can cause you to bleed more. Ask your doctor if you can take acetaminophen (Tylenol). Read and follow all instructions on the label.  >Use a stool softener that contains bran or psyllium. You can save money by buying bran or psyllium (available in bulk at most health food stores) and sprinkling it on foods or stirring it into fruit juice. You can also use a product such as Metamucil or Benefibre.  >Take your medicines exactly as directed. Call your doctor or nurse advice line if you think you are having a problem with your medicine.  Call 911 anytime you think you may need emergency care. For example, call if:  >You passed out (lost consciousness).  >You have new or worse pain.  >You have new or worse bleeding from the rectum.  >You are dizzy or light-headed, or you feel like you may faint.  >Drop in Blood Pressure   >You cannot pass stool or gas.  >You do not get better as expected.        SECONDARY DISCHARGE DIAGNOSES  Diagnosis: HTN (hypertension)  Assessment and Plan of Treatment: You were continued on your home medications, please continue to take as prescribed. Follow up with your primary care doctor as for when to resume Aspirin.

## 2025-01-21 NOTE — DISCHARGE NOTE PROVIDER - NSFOLLOWUPCLINICS_GEN_ALL_ED_FT
[facility]  Gastroenterology  [address] [place] [address]  Phone: [phone]  Fax:   Follow Up Time: 1 month

## 2025-01-21 NOTE — PROGRESS NOTE ADULT - TIME BILLING
The submitted E/M billing level for this visit reflects the total time spent on the day reviewing documentation in EMR, face-to-face time spent with the patient, non-face-to-face review of medical records and relevant information, review of laboratory results and any relevant imaging. Patient was counseled on their diagnostic tests and treatment plan.

## 2025-01-21 NOTE — DISCHARGE NOTE PROVIDER - CARE PROVIDERS DIRECT ADDRESSES
[email or web address]

## 2025-01-21 NOTE — DISCHARGE NOTE PROVIDER - CARE PROVIDER_API CALL
Efrain Walker  Gastroenterology  [address] [facility], [address] [place] [id number]  Phone: [phone]  Fax: [phone]  Follow Up Time: 1 month    Aisha Delcid  Internal Medicine  [address] [place] [id number]  Phone: [phone]  Fax: [phone]  Follow Up Time: 2 weeks

## 2025-01-21 NOTE — DISCHARGE NOTE PROVIDER - NSDCMRMEDTOKEN_GEN_ALL_CORE_FT
allopurinol 100 mg oral tablet: 1 tab(s) orally once a day  aspirin 81 mg oral tablet: 1 tab(s) orally once a day  Biotin Tablet: 1 tablet orally once a day  hydroCHLOROthiazide 25 mg oral tablet: 1 tab(s) orally once a day  losartan 50 mg oral tablet: 1 tab(s) orally once a day  Magnesium Tablet: 1 tablet orally once a day  metoprolol succinate 25 mg oral tablet, extended release: 1 tab(s) orally once a day  Vitamin C Tablet: 1 tablet orally once a day   allopurinol 100 mg oral tablet: 1 tab(s) orally once a day  hydroCHLOROthiazide 25 mg oral tablet: 1 tab(s) orally once a day  losartan 50 mg oral tablet: 1 tab(s) orally once a day  metoprolol succinate 25 mg oral tablet, extended release: 1 tab(s) orally once a day

## 2025-01-21 NOTE — PROGRESS NOTE ADULT - PROBLEM SELECTOR PLAN 1
Painless Hematochezia. Dark red blood found in rectal vault on GI exam. Hgb 13 on admission with slight drop to 12. Hemodynamically stable. Per GI, if patient with resolving hematochezia and with stable hemoglobin can likely defer to outpatient colonoscopy.  However, if patient with worsening hematochezia, with downtrending hemoglobin will consider for inpatient endoscopic evaluation. If patient w/ worsening hematochezia overnight w/ associated hemodynamic changes requiring pRBC transfusion, would obtain CTA for localization of bleeding source.     Plan:   -GI following  -trend CBC, transfuse for Hgb <7  -f/u AM CBC, CMP, PT/INR  -maintain active T&S  -clear liquid diet  -Hold Aspirin

## 2025-01-21 NOTE — DISCHARGE NOTE PROVIDER - NSDCFUADDAPPT_GEN_ALL_CORE_FT
APPTS ARE READY TO BE MADE: [ ] YES    Best Family or Patient Contact (if needed):    Additional Information about above appointments (if needed):    1: GI appointment   2:   3:     Other comments or requests:    APPTS ARE READY TO BE MADE: [ x] YES    Best Family or Patient Contact (if needed):    Additional Information about above appointments (if needed):    1: GI appointment either with Dr. Walker or partners or [facility]  2:   3:     Other comments or requests:

## 2025-01-21 NOTE — DISCHARGE NOTE NURSING/CASE MANAGEMENT/SOCIAL WORK - NSDCFUADDAPPT_GEN_ALL_CORE_FT
APPTS ARE READY TO BE MADE: [ x] YES    Best Family or Patient Contact (if needed):    Additional Information about above appointments (if needed):    1: GI appointment either with Dr. Walker or partners or [facility]  2:   3:     Other comments or requests:

## 2025-01-21 NOTE — PROGRESS NOTE ADULT - SUBJECTIVE AND OBJECTIVE BOX
PROGRESS NOTE:   Authored by Sonja Simmons MD  Internal Medicine Resident Physician, PGY-1      Patient is a 79y old  Female who presents with a chief complaint of hematochezia (20 Jan 2025 15:12)      SUBJECTIVE / OVERNIGHT EVENTS: ***, patient seen and examined at bedside    MEDICATIONS  (STANDING):  allopurinol 100 milliGRAM(s) Oral daily  losartan 50 milliGRAM(s) Oral daily  metoprolol succinate ER 25 milliGRAM(s) Oral daily    MEDICATIONS  (PRN):  melatonin 3 milliGRAM(s) Oral at bedtime PRN Insomnia      CAPILLARY BLOOD GLUCOSE        I&O's Summary      PHYSICAL EXAM:  Vital Signs Last 24 Hrs  T(C): 36.4 (21 Jan 2025 04:27), Max: 36.8 (20 Jan 2025 10:19)  T(F): 97.5 (21 Jan 2025 04:27), Max: 98.2 (20 Jan 2025 10:19)  HR: 74 (21 Jan 2025 04:27) (74 - 115)  BP: 104/64 (21 Jan 2025 04:27) (104/64 - 161/91)  BP(mean): --  RR: 18 (21 Jan 2025 04:27) (16 - 19)  SpO2: 92% (21 Jan 2025 04:27) (92% - 99%)    Parameters below as of 21 Jan 2025 04:27  Patient On (Oxygen Delivery Method): room air      CONSTITUTIONAL: Well-groomed, in no apparent distress  RESPIRATORY: Breathing comfortably; no dullness to percussion; lungs CTA without wheeze/rhonchi/rales  CARDIOVASCULAR: +S1S2, RRR, no M/G/R; pedal pulses full and symmetric; no lower extremity edema  GASTROINTESTINAL: No palpable masses or tenderness, +BS throughout, no rebound/guarding; no hepatosplenomegaly; no hernia palpated  SKIN: No rashes or ulcers noted  NEUROLOGIC: A+O x 3, CN II-XII intact; sensation intact in LEs b/l to light touch    LABS:                        12.0   8.33  )-----------( 264      ( 20 Jan 2025 14:26 )             37.5     01-20    138  |  103  |  26[H]  ----------------------------<  179[H]  4.2   |  21[L]  |  0.79    Ca    10.2      20 Jan 2025 13:12    TPro  7.5  /  Alb  4.1  /  TBili  0.3  /  DBili  x   /  AST  25  /  ALT  30  /  AlkPhos  89  01-20    PT/INR - ( 20 Jan 2025 11:19 )   PT: 10.4 sec;   INR: 0.91 ratio         PTT - ( 20 Jan 2025 11:19 )  PTT:33.1 sec      Urinalysis Basic - ( 20 Jan 2025 13:12 )    Color: x / Appearance: x / SG: x / pH: x  Gluc: 179 mg/dL / Ketone: x  / Bili: x / Urobili: x   Blood: x / Protein: x / Nitrite: x   Leuk Esterase: x / RBC: x / WBC x   Sq Epi: x / Non Sq Epi: x / Bacteria: x          RADIOLOGY & ADDITIONAL TESTS:  Results Reviewed:   Imaging Personally Reviewed:  Electrocardiogram Personally Reviewed:   PROGRESS NOTE:   Authored by Sonja Simmons MD  Internal Medicine Resident Physician, PGY-1      Patient is a 79y old  Female who presents with a chief complaint of hematochezia (20 Jan 2025 15:12)      SUBJECTIVE / OVERNIGHT EVENTS: No acute event overnight. Patient seen and examined at bedside. No bowel movements or additional episodes of hematochezia. VSS. She is interested in going home and following up outpt.     MEDICATIONS  (STANDING):  allopurinol 100 milliGRAM(s) Oral daily  losartan 50 milliGRAM(s) Oral daily  metoprolol succinate ER 25 milliGRAM(s) Oral daily    MEDICATIONS  (PRN):  melatonin 3 milliGRAM(s) Oral at bedtime PRN Insomnia      CAPILLARY BLOOD GLUCOSE        I&O's Summary      PHYSICAL EXAM:  Vital Signs Last 24 Hrs  T(C): 36.4 (21 Jan 2025 04:27), Max: 36.8 (20 Jan 2025 10:19)  T(F): 97.5 (21 Jan 2025 04:27), Max: 98.2 (20 Jan 2025 10:19)  HR: 74 (21 Jan 2025 04:27) (74 - 115)  BP: 104/64 (21 Jan 2025 04:27) (104/64 - 161/91)  BP(mean): --  RR: 18 (21 Jan 2025 04:27) (16 - 19)  SpO2: 92% (21 Jan 2025 04:27) (92% - 99%)    Parameters below as of 21 Jan 2025 04:27  Patient On (Oxygen Delivery Method): room air    PHYSICAL EXAM:  GENERAL: NAD, lying in bed comfortably  HEAD:  Atraumatic, Normocephalic  EYES: EOMI, PERRL, conjunctiva and sclera clear  ENT: Moist mucous membranes  CHEST/LUNG: Clear to auscultation bilaterally; No rales, rhonchi, wheezing, or rubs. Unlabored respirations  HEART: Regular rate and rhythm; No murmurs, rubs, or gallops  ABDOMEN: Bowel sounds present; Soft, Nontender, Nondistended.   EXTREMITIES:  2+ Peripheral Pulses, brisk capillary refill. No clubbing, cyanosis, or edema  NERVOUS SYSTEM:  Alert & Oriented X3, speech clear. No deficits   MSK: FROM all 4 extremities, full and equal strength  SKIN: No rashes or lesions        LABS:                        12.0   8.33  )-----------( 264      ( 20 Jan 2025 14:26 )             37.5     01-20    138  |  103  |  26[H]  ----------------------------<  179[H]  4.2   |  21[L]  |  0.79    Ca    10.2      20 Jan 2025 13:12    TPro  7.5  /  Alb  4.1  /  TBili  0.3  /  DBili  x   /  AST  25  /  ALT  30  /  AlkPhos  89  01-20    PT/INR - ( 20 Jan 2025 11:19 )   PT: 10.4 sec;   INR: 0.91 ratio         PTT - ( 20 Jan 2025 11:19 )  PTT:33.1 sec      Urinalysis Basic - ( 20 Jan 2025 13:12 )    Color: x / Appearance: x / SG: x / pH: x  Gluc: 179 mg/dL / Ketone: x  / Bili: x / Urobili: x   Blood: x / Protein: x / Nitrite: x   Leuk Esterase: x / RBC: x / WBC x   Sq Epi: x / Non Sq Epi: x / Bacteria: x          RADIOLOGY & ADDITIONAL TESTS:  Results Reviewed:   Imaging Personally Reviewed:  Electrocardiogram Personally Reviewed:

## 2025-01-21 NOTE — DISCHARGE NOTE PROVIDER - PROVIDER TOKENS
PROVIDER:[TOKEN:[[id number]],FOLLOWUP:[1 month]],PROVIDER:[TOKEN:[[id number]],FOLLOWUP:[2 weeks]]

## 2025-01-21 NOTE — DISCHARGE NOTE NURSING/CASE MANAGEMENT/SOCIAL WORK - PATIENT PORTAL LINK FT
You can access the FollowMyHealth Patient Portal offered by [facility] by registering at the following website: [email or web address]. By joining [facility]’s FollowMyHealth portal, you will also be able to view your health information using other applications (apps) compatible with our system.

## 2025-01-21 NOTE — DISCHARGE NOTE PROVIDER - NSDCFUSCHEDAPPT_GEN_ALL_CORE_FT
[facility]  [facility] [address] [facility]  Scheduled Appointment: 03/31/2025

## 2025-01-21 NOTE — DISCHARGE NOTE NURSING/CASE MANAGEMENT/SOCIAL WORK - FINANCIAL ASSISTANCE
[facility] provides services at a reduced cost to those who are determined to be eligible through [facility]’s financial assistance program. Information regarding [facility]’s financial assistance program can be found by going to [email or web address] or by calling [phone].

## 2025-01-21 NOTE — PROGRESS NOTE ADULT - ASSESSMENT
79-year-old female presenting for rectal bleeding.  Vitals initially with tachycardia, however on recheck in the ED-- all were within normal limits. Hemodynamically stable without signs of anemia-- Hgb 13 on admission with mild decrease to 12.  Per GI exam-- dark red blood in vault, no internal hemorrhoids palpated. Cologuard negative last yr, last colonoscopy more than 10yrs ago. Most likely lower GI Bleed--differential includes hemorrhoids vs diverticulosis.        79-year-old female presenting for rectal bleeding.  Vitals initially with tachycardia, however on recheck in the ED-- all were within normal limits. Hemodynamically stable without signs of anemia-- Hgb 13 on admission with mild decrease to 12.  Per GI exam-- dark red blood in vault, no internal hemorrhoids palpated. Cologuard negative last yr, last colonoscopy more than 10yrs ago. Most likely lower GI Bleed--differential includes hemorrhoids vs diverticulosis. Pt is stable and ready for discharge home today.

## 2025-01-21 NOTE — DISCHARGE NOTE PROVIDER - HOSPITAL COURSE
HPI:  79 yr old woman with history of HTN, Rheumatoid Arthritis, cholecystectomy and hysterectomy presents for 1 day of hematochezia. The pt first noticed blood in her stool the morning of 1/20 which she had 3 episodes. She had one additional episode in the ED which she says was improved but blood was still present. States that the blood was mixed in with the stool and the color was bright red. Never had an episode like this before. Not associated with any abdominal pain , changes in diet, nausea, vomiting, fever or pain. Has a cough that she states has been resolving. Her normal bowel movement schedule is 1x daily. She takes baby aspirin at home but no other anticoagulants. Last colonoscopy was more than 10 years ago. No history of hemorrhoids.  (20 Jan 2025 15:12)    Hospital Course:      The patient is afebrile, hemodynamically stable and medically optimized for discharge to ___ with follow up with ____. On day of discharge, patient is clinically stable with no new exam findings or acute symptoms compared to prior. The patient was seen by the attending physician on the date of discharge and deemed stable and acceptable for discharge. The patient's chronic medical conditions were treated accordingly per the patient's home medication regimen. The patient's medication reconciliation (with changes made to chronic medications), follow up appointments, discharge orders, instructions, and significant lab and diagnostic studies are as noted.      Important Medication Changes and Reason:      Active or Pending Issues Requiring Follow-up:      Advanced Directives:   [ ] Full code  [ ] DNR  [ ] Hospice             HPI:  79 yr old woman with history of HTN, Rheumatoid Arthritis, cholecystectomy and hysterectomy presents for 1 day of hematochezia. The pt first noticed blood in her stool the morning of 1/20 which she had 3 episodes. She had one additional episode in the ED which she says was improved but blood was still present. States that the blood was mixed in with the stool and the color was bright red. Never had an episode like this before. Not associated with any abdominal pain , changes in diet, nausea, vomiting, fever or pain. Has a cough that she states has been resolving. Her normal bowel movement schedule is 1x daily. She takes baby aspirin at home but no other anticoagulants. Last colonoscopy was more than 10 years ago. No history of hemorrhoids.  (20 Jan 2025 15:12)    Hospital Course:  This patient presented to the hospital with hematochezia. Initial vital signs showed tachycardia, but subsequent checks in the ED revealed normal vital signs and the patient remained hemodynamically stable. Hemoglobin was 13 g/dL on admission, with a mild decrease to 12 g/dL during the hospital stay. The patient seen and evaluated by the gastroenterology team. Gastrointestinal examination revealed dark red blood in the rectal vault, with no palpable internal hemorrhoids. The patient has a negative Cologuard result from last year and their last colonoscopy was over 10 years ago. T The most likely diagnosis is a lower gastrointestinal bleed, with a differential diagnosis including hemorrhoids and diverticulosis. While inpatient she did not require any transfusions and was hemodynamically stable throughout. Hgb was 11.7 on day of discharge.     The patient is afebrile, hemodynamically stable and medically optimized for discharge to home with follow up with Primary Care. On day of discharge, patient is clinically stable with no new exam findings or acute symptoms compared to prior. The patient was seen by the attending physician on the date of discharge and deemed stable and acceptable for discharge. The patient's chronic medical conditions were treated accordingly per the patient's home medication regimen. The patient's medication reconciliation (with changes made to chronic medications), follow up appointments, discharge orders, instructions, and significant lab and diagnostic studies are as noted.      Important Medication Changes and Reason:  Stop Aspirin 81 in the setting of bleeding      Active or Pending Issues Requiring Follow-up:  -restarting Aspirin       Advanced Directives:   [X] Full code  [ ] DNR  [ ] Hospice             HPI:  79 yr old woman with history of HTN, Rheumatoid Arthritis, cholecystectomy and hysterectomy presents for 1 day of hematochezia. The pt first noticed blood in her stool the morning of 1/20 which she had 3 episodes. She had one additional episode in the ED which she says was improved but blood was still present. States that the blood was mixed in with the stool and the color was bright red. Never had an episode like this before. Not associated with any abdominal pain , changes in diet, nausea, vomiting, fever or pain. Has a cough that she states has been resolving. Her normal bowel movement schedule is 1x daily. She takes baby aspirin at home but no other anticoagulants. Last colonoscopy was more than 10 years ago. No history of hemorrhoids.  (20 Jan 2025 15:12)    Hospital Course:  This patient presented to the hospital with hematochezia. Initial vital signs showed tachycardia, but subsequent checks in the ED revealed normal vital signs and the patient remained hemodynamically stable. Hemoglobin was 13 g/dL on admission, with a mild decrease to 12 g/dL during the hospital stay. The patient seen and evaluated by the gastroenterology team. Gastrointestinal examination revealed dark red blood in the rectal vault, with no palpable internal hemorrhoids. The patient has a negative Cologuard result from last year and their last colonoscopy was over 10 years ago. The most likely diagnosis is a lower gastrointestinal bleed, with a differential diagnosis including hemorrhoids and diverticulosis. While inpatient she did not require any transfusions and was hemodynamically stable throughout. Hgb was 11.7 on day of discharge.     The patient is afebrile, hemodynamically stable and medically optimized for discharge to home with follow up with Primary Care. On day of discharge, patient is clinically stable with no new exam findings or acute symptoms compared to prior. The patient was seen by the attending physician on the date of discharge and deemed stable and acceptable for discharge. The patient's chronic medical conditions were treated accordingly per the patient's home medication regimen. The patient's medication reconciliation (with changes made to chronic medications), follow up appointments, discharge orders, instructions, and significant lab and diagnostic studies are as noted.      Important Medication Changes and Reason:  Stop Aspirin 81 in the setting of bleeding      Active or Pending Issues Requiring Follow-up:  -restarting Aspirin       Advanced Directives:   [X] Full code  [ ] DNR  [ ] Hospice

## 2025-01-21 NOTE — PROGRESS NOTE ADULT - ATTENDING COMMENTS
79yoF with PMH of HTN and RA presenting with hematochezia. CBCs in hospital with stable Hgb and patient no longer endorsing blood in bowel movements. Will plan for DC home today with outpatient GI follow up. Return precautions explained. Will hold aspirin on discharge. Will resume home anti-hypertensives.    35 minutes spent on DC planning.    Rest of plan as above.

## 2025-01-21 NOTE — PROGRESS NOTE ADULT - PROBLEM SELECTOR PLAN 2
c/w outpt medications Metroprolol 25mg QD, Losartan 50mg QD  -Hold hydrochlorothiazide 25mg QD i/s/o GI bleed

## 2025-01-21 NOTE — DISCHARGE NOTE PROVIDER - ATTENDING DISCHARGE PHYSICAL EXAMINATION:
Vital Signs Last 24 Hrs  T(C): 36.8 (21 Jan 2025 12:30), Max: 36.8 (20 Jan 2025 15:27)  T(F): 98.2 (21 Jan 2025 12:30), Max: 98.2 (20 Jan 2025 15:27)  HR: 97 (21 Jan 2025 12:30) (74 - 97)  BP: 118/78 (21 Jan 2025 12:30) (104/64 - 146/83)  BP(mean): --  RR: 18 (21 Jan 2025 12:30) (16 - 18)  SpO2: 95% (21 Jan 2025 12:30) (92% - 96%)    Parameters below as of 21 Jan 2025 12:30  Patient On (Oxygen Delivery Method): room air        CONSTITUTIONAL: NAD, well-groomed  EYES: PERRLA; conjunctiva and sclera clear  ENMT: Moist oral mucosa  NECK: Supple, no palpable masses; no thyromegaly  RESPIRATORY: Normal respiratory effort; lungs are clear to auscultation bilaterally  CARDIOVASCULAR: Regular rate and rhythm, normal S1 and S2, no murmur/rub/gallop; No lower extremity edema; Peripheral pulses are 2+ bilaterally  ABDOMEN: Nontender to palpation, normoactive bowel sounds, no rebound/guarding; No hepatosplenomegaly  MUSCULOSKELETAL:  Normal gait; no clubbing or cyanosis of digits; no joint swelling or tenderness to palpation  PSYCH: A+O to person, place, and time; affect appropriate  NEUROLOGY: CN 2-12 are intact and symmetric; no gross sensory deficits   SKIN: No rashes; no palpable lesions

## 2025-03-18 DIAGNOSIS — I10 ESSENTIAL (PRIMARY) HYPERTENSION: ICD-10-CM

## 2025-03-18 DIAGNOSIS — T45.2X1A POISONING BY VITAMINS, ACCIDENTAL (UNINTENTIONAL), INITIAL ENCOUNTER: ICD-10-CM

## 2025-03-24 ENCOUNTER — APPOINTMENT (OUTPATIENT)
Dept: INTERNAL MEDICINE | Facility: CLINIC | Age: 80
End: 2025-03-24
Payer: MEDICARE

## 2025-03-24 ENCOUNTER — APPOINTMENT (OUTPATIENT)
Dept: CARDIOLOGY | Facility: CLINIC | Age: 80
End: 2025-03-24
Payer: MEDICARE

## 2025-03-24 LAB
25(OH)D3 SERPL-MCNC: 43.6 NG/ML
ALBUMIN SERPL ELPH-MCNC: 4.3 G/DL
ALP BLD-CCNC: 82 U/L
ALT SERPL-CCNC: 16 U/L
ANION GAP SERPL CALC-SCNC: 14 MMOL/L
AST SERPL-CCNC: 21 U/L
BASOPHILS # BLD AUTO: 0.07 K/UL
BASOPHILS NFR BLD AUTO: 1 %
BILIRUB SERPL-MCNC: 0.3 MG/DL
BUN SERPL-MCNC: 34 MG/DL
CALCIUM SERPL-MCNC: 9.9 MG/DL
CHLORIDE SERPL-SCNC: 107 MMOL/L
CHOLEST SERPL-MCNC: 210 MG/DL
CO2 SERPL-SCNC: 22 MMOL/L
CREAT SERPL-MCNC: 0.95 MG/DL
EGFRCR SERPLBLD CKD-EPI 2021: 61 ML/MIN/1.73M2
EOSINOPHIL # BLD AUTO: 0.18 K/UL
EOSINOPHIL NFR BLD AUTO: 2.6 %
ESTIMATED AVERAGE GLUCOSE: 154 MG/DL
FOLATE SERPL-MCNC: 16.7 NG/ML
GLUCOSE SERPL-MCNC: 155 MG/DL
HBA1C MFR BLD HPLC: 7 %
HCT VFR BLD CALC: 42.3 %
HDLC SERPL-MCNC: 60 MG/DL
HGB BLD-MCNC: 13.4 G/DL
IMM GRANULOCYTES NFR BLD AUTO: 0.9 %
LDLC SERPL-MCNC: 133 MG/DL
LYMPHOCYTES # BLD AUTO: 1.64 K/UL
LYMPHOCYTES NFR BLD AUTO: 23.6 %
MAGNESIUM SERPL-MCNC: 2.4 MG/DL
MAN DIFF?: NORMAL
MCHC RBC-ENTMCNC: 30.2 PG
MCHC RBC-ENTMCNC: 31.7 G/DL
MCV RBC AUTO: 95.5 FL
MONOCYTES # BLD AUTO: 0.42 K/UL
MONOCYTES NFR BLD AUTO: 6.1 %
NEUTROPHILS # BLD AUTO: 4.57 K/UL
NEUTROPHILS NFR BLD AUTO: 65.8 %
NONHDLC SERPL-MCNC: 150 MG/DL
PHOSPHATE SERPL-MCNC: 2.9 MG/DL
PLATELET # BLD AUTO: 301 K/UL
POTASSIUM SERPL-SCNC: 4.5 MMOL/L
PROT SERPL-MCNC: 8.3 G/DL
RBC # BLD: 4.43 M/UL
RBC # FLD: 13.4 %
SODIUM SERPL-SCNC: 143 MMOL/L
TRIGL SERPL-MCNC: 99 MG/DL
TSH SERPL-ACNC: 1.04 UIU/ML
VIT B12 SERPL-MCNC: 688 PG/ML
WBC # FLD AUTO: 6.94 K/UL

## 2025-03-24 PROCEDURE — 93306 TTE W/DOPPLER COMPLETE: CPT

## 2025-03-24 PROCEDURE — 36415 COLL VENOUS BLD VENIPUNCTURE: CPT

## 2025-03-25 LAB
24R-OH-CALCIDIOL SERPL-MCNC: 61.5 PG/ML
CA-I SERPL-SCNC: 5.2 MG/DL

## 2025-03-31 ENCOUNTER — APPOINTMENT (OUTPATIENT)
Dept: INTERNAL MEDICINE | Facility: CLINIC | Age: 80
End: 2025-03-31
Payer: MEDICARE

## 2025-03-31 VITALS
DIASTOLIC BLOOD PRESSURE: 89 MMHG | SYSTOLIC BLOOD PRESSURE: 151 MMHG | BODY MASS INDEX: 22.85 KG/M2 | HEART RATE: 91 BPM | TEMPERATURE: 98.2 F | OXYGEN SATURATION: 97 % | WEIGHT: 117 LBS

## 2025-03-31 DIAGNOSIS — R73.03 PREDIABETES.: ICD-10-CM

## 2025-03-31 DIAGNOSIS — T45.2X1A POISONING BY VITAMINS, ACCIDENTAL (UNINTENTIONAL), INITIAL ENCOUNTER: ICD-10-CM

## 2025-03-31 DIAGNOSIS — I10 ESSENTIAL (PRIMARY) HYPERTENSION: ICD-10-CM

## 2025-03-31 DIAGNOSIS — M10.9 GOUT, UNSPECIFIED: ICD-10-CM

## 2025-03-31 PROCEDURE — 99215 OFFICE O/P EST HI 40 MIN: CPT

## 2025-03-31 PROCEDURE — G2211 COMPLEX E/M VISIT ADD ON: CPT

## 2025-09-08 ENCOUNTER — APPOINTMENT (OUTPATIENT)
Dept: INTERNAL MEDICINE | Facility: CLINIC | Age: 80
End: 2025-09-08
Payer: MEDICARE

## 2025-09-08 PROCEDURE — 36415 COLL VENOUS BLD VENIPUNCTURE: CPT

## 2025-09-15 ENCOUNTER — APPOINTMENT (OUTPATIENT)
Dept: INTERNAL MEDICINE | Facility: CLINIC | Age: 80
End: 2025-09-15
Payer: MEDICARE

## 2025-09-15 VITALS
DIASTOLIC BLOOD PRESSURE: 77 MMHG | HEIGHT: 60 IN | BODY MASS INDEX: 23.56 KG/M2 | OXYGEN SATURATION: 96 % | HEART RATE: 75 BPM | WEIGHT: 120 LBS | SYSTOLIC BLOOD PRESSURE: 151 MMHG

## 2025-09-15 DIAGNOSIS — I10 ESSENTIAL (PRIMARY) HYPERTENSION: ICD-10-CM

## 2025-09-15 DIAGNOSIS — R73.03 PREDIABETES.: ICD-10-CM

## 2025-09-15 DIAGNOSIS — M10.9 GOUT, UNSPECIFIED: ICD-10-CM

## 2025-09-15 DIAGNOSIS — T45.2X1A POISONING BY VITAMINS, ACCIDENTAL (UNINTENTIONAL), INITIAL ENCOUNTER: ICD-10-CM

## 2025-09-15 PROCEDURE — G2211 COMPLEX E/M VISIT ADD ON: CPT

## 2025-09-15 PROCEDURE — 99215 OFFICE O/P EST HI 40 MIN: CPT
